# Patient Record
Sex: FEMALE | Race: WHITE | NOT HISPANIC OR LATINO | Employment: FULL TIME | URBAN - METROPOLITAN AREA
[De-identification: names, ages, dates, MRNs, and addresses within clinical notes are randomized per-mention and may not be internally consistent; named-entity substitution may affect disease eponyms.]

---

## 2017-03-03 ENCOUNTER — TELEPHONE (OUTPATIENT)
Dept: OBSTETRICS AND GYNECOLOGY | Facility: CLINIC | Age: 47
End: 2017-03-03

## 2017-03-03 NOTE — TELEPHONE ENCOUNTER
----- Message from Chago Luciano MD sent at 3/3/2017  7:29 AM EST -----  Call patient: Normal mammogram

## 2017-11-02 ENCOUNTER — OFFICE VISIT (OUTPATIENT)
Dept: OBSTETRICS AND GYNECOLOGY | Facility: CLINIC | Age: 47
End: 2017-11-02

## 2017-11-02 VITALS
WEIGHT: 110 LBS | SYSTOLIC BLOOD PRESSURE: 110 MMHG | DIASTOLIC BLOOD PRESSURE: 76 MMHG | BODY MASS INDEX: 19.49 KG/M2 | HEIGHT: 63 IN

## 2017-11-02 DIAGNOSIS — Z12.31 SCREENING MAMMOGRAM, ENCOUNTER FOR: ICD-10-CM

## 2017-11-02 DIAGNOSIS — Z01.419 WELL WOMAN EXAM WITH ROUTINE GYNECOLOGICAL EXAM: Primary | ICD-10-CM

## 2017-11-02 PROCEDURE — 99396 PREV VISIT EST AGE 40-64: CPT | Performed by: NURSE PRACTITIONER

## 2017-11-02 RX ORDER — EMOLLIENT COMBINATION NO. 15
CREAM (GRAM) MISCELLANEOUS 2 TIMES DAILY
COMMUNITY

## 2017-11-02 RX ORDER — THYROID,PORK 90 MG
TABLET ORAL
COMMUNITY
Start: 2017-10-28

## 2017-11-02 RX ORDER — AMOXICILLIN 875 MG/1
TABLET, COATED ORAL
COMMUNITY
Start: 2017-10-27 | End: 2018-12-04

## 2017-11-02 RX ORDER — ESCITALOPRAM OXALATE 20 MG/1
TABLET ORAL
COMMUNITY
Start: 2017-10-18

## 2017-11-02 NOTE — PROGRESS NOTES
[facility]  Routine Annual Visit    2017    Patient: Maria C Wheeler          MR#:[id number]      History of Present Illness    47 y.o. female  who presents for annual exam. Last annual with Dr. Medina May 2016. Pap ASCUS, HPV negative. Mammogram negative 2017. She reports no periods since January. She is receiving hormonal support through  again. She sees Dr. Ryder for primary care. She has not yet had colonoscopy. She has no complaints today.    No LMP recorded (lmp unknown). Patient is not currently having periods (Reason: Perimenopausal).  Obstetric History:  OB History      Para Term  AB Living    0 0 0 0 0 0    SAB TAB Ectopic Multiple Live Births    0 0 0 0          Menstrual History:     No LMP recorded (lmp unknown). Patient is not currently having periods (Reason: Perimenopausal).       Sexual History:       ________________________________________  There is no problem list on file for this patient.      Past Medical History:   Diagnosis Date   • Abnormal Pap smear of cervix     history of ascus, lgsil=cryotherapy   • Frequent UTI    • MVP (mitral valve prolapse)    • Pyelonephritis        Past Surgical History:   Procedure Laterality Date   • BREAST AUGMENTATION     • CHOLECYSTECTOMY     • CRYOTHERAPY     • TONSILLECTOMY      age 26       History   Smoking Status   • Never Smoker   Smokeless Tobacco   • Never Used       Family History   Problem Relation Age of Onset   • Cancer Father    • Fibromyalgia Mother    • Diverticulitis Mother    • No Known Problems Brother    • Breast cancer Cousin      age 39       Prior to Admission medications    Medication Sig Start Date End Date Taking? Authorizing Provider   amoxicillin (AMOXIL) 875 MG tablet  10/27/17  Yes Historical Provider, MD BEASLEY THYROID 90 MG tablet  10/28/17  Yes Historical Provider, MD   Cholecalciferol (VITAMIN D3) 5000 units chewable tablet Chew 2 tablet by mouth daily with  food 10/24/17  Yes Historical Provider, MD   Cream Base (PCCA LIPODERM BASE) cream Apply  topically 2 (Two) Times a Day. TESTOSTERONE AND /OR ESTROGEN 2 CLICKS BID.   Yes Historical Provider, MD   escitalopram (LEXAPRO) 20 MG tablet  10/18/17  Yes Historical Provider, MD   Progesterone Micronized (PROGESTERONE PO) Take 300 mg by mouth Every Night. 10/2/17  Yes Historical Provider, MD   medroxyPROGESTERone (PROVERA) 2.5 MG tablet Take 1 tablet by mouth daily. 5/26/16 11/2/17  Julia Medina MD   progesterone (PROMETRIUM) 100 MG capsule Take 2 capsules by mouth every night for 30 days. 5/24/16 11/2/17  Julia Medina MD     ________________________________________    Current contraception: vasectomy  History of abnormal Pap smear: yes - ascus hpv neg last year; hx lgsil  Family history of uterine or ovarian cancer: no  Family History of colon cancer/colon polyps: no  History of abnormal mammogram: no  History of abnormal lipids: no    The following portions of the patient's history were reviewed and updated as appropriate: allergies, current medications, past family history, past medical history, past social history, past surgical history and problem list.    Review of Systems   Constitutional: Negative.    HENT: Negative.    Eyes: Negative for visual disturbance.   Respiratory: Negative for cough, shortness of breath and wheezing.    Cardiovascular: Negative for chest pain, palpitations and leg swelling.   Gastrointestinal: Negative for abdominal distention, abdominal pain, blood in stool, constipation, diarrhea, nausea and vomiting.   Endocrine: Negative for cold intolerance and heat intolerance.   Genitourinary: Negative for difficulty urinating, dyspareunia, dysuria, frequency, genital sores, hematuria, menstrual problem, pelvic pain, urgency, vaginal bleeding, vaginal discharge and vaginal pain.   Musculoskeletal: Negative.    Skin: Negative.    Neurological: Negative for dizziness, weakness,  "light-headedness, numbness and headaches.   Hematological: Negative.    Psychiatric/Behavioral: Negative.    Breasts: negative for lumps skin changes, dimpling, swelling, nipple changes/discharge bilaterally       Objective   Physical Exam    /76  Ht 63\" (160 cm)  Wt 110 lb (49.9 kg)  LMP  (LMP Unknown)  Breastfeeding? No  BMI 19.49 kg/m2   BP Readings from Last 3 Encounters:   11/02/17 110/76   05/24/16 118/82      Wt Readings from Last 3 Encounters:   11/02/17 110 lb (49.9 kg)   05/24/16 126 lb (57.2 kg)        BMI: Estimated body mass index is 19.49 kg/(m^2) as calculated from the following:    Height as of this encounter: 63\" (160 cm).    Weight as of this encounter: 110 lb (49.9 kg).      General:   alert, appears stated age and cooperative   Heart: regular rate and rhythm, S1, S2 normal, no murmur, click, rub or gallop   Lungs: clear to auscultation bilaterally   Abdomen: soft, non-tender, without masses or organomegaly   Breast: inspection negative, no nipple discharge or bleeding, no masses or nodularity palpable and bilateral implants intact   Vulva: normal   Vagina: normal mucosa, normal discharge   Cervix: no bleeding following Pap, no cervical motion tenderness, no lesions and nulliparous appearance   Uterus: normal size, mobile, non-tender or normal shape and consistency   Adnexa: no mass, fullness, tenderness     Assessment:    1. Normal gynecological exam  2. Healthy lifestyle modifications discussed, counseled on self breast exams and bone health  3. Counseled on menopause     Plan:    []  Rx:   []  Mammogram request made  [x]  PAP done  []  Occult fecal blood test ([facility])  []  Labs:   []  GC/Chl/TV  []  DEXA scan   []  Referral for colonoscopy:           JER Sher  11/2/2017 9:54 AM  "

## 2017-11-07 LAB
CYTOLOGIST CVX/VAG CYTO: NORMAL
CYTOLOGY CVX/VAG DOC THIN PREP: NORMAL
DX ICD CODE: NORMAL
HIV 1 & 2 AB SER-IMP: NORMAL
HPV I/H RISK 4 DNA CVX QL PROBE+SIG AMP: NEGATIVE
Lab: NORMAL
OTHER STN SPEC: NORMAL
PATH REPORT.FINAL DX SPEC: NORMAL
STAT OF ADQ CVX/VAG CYTO-IMP: NORMAL

## 2018-12-04 ENCOUNTER — OFFICE VISIT (OUTPATIENT)
Dept: OBSTETRICS AND GYNECOLOGY | Age: 48
End: 2018-12-04

## 2018-12-04 VITALS
WEIGHT: 121 LBS | SYSTOLIC BLOOD PRESSURE: 104 MMHG | DIASTOLIC BLOOD PRESSURE: 62 MMHG | BODY MASS INDEX: 21.44 KG/M2 | HEIGHT: 63 IN

## 2018-12-04 DIAGNOSIS — Z12.11 SCREENING FOR COLON CANCER: ICD-10-CM

## 2018-12-04 DIAGNOSIS — Z01.419 WELL WOMAN EXAM WITH ROUTINE GYNECOLOGICAL EXAM: Primary | ICD-10-CM

## 2018-12-04 PROCEDURE — 99396 PREV VISIT EST AGE 40-64: CPT | Performed by: NURSE PRACTITIONER

## 2018-12-04 NOTE — PROGRESS NOTES
[facility]  Routine Annual Visit    2018    Patient: Maria C Wheeler          MR#:[id number]      History of Present Illness    48 y.o. female  who presents for annual exam. Pap negative, HPV negative 2017. Last mammogram on file negative 3/2017- pt reports negative mammo this year- calling for results from [facility]. Postmenopausal- no bleeding. She continues hormonal support through [employer] but considering not continuing. She denies new medical hx. No complaints today. Has not yet had screening colonoscopy but accepts order.    No LMP recorded (lmp unknown). Patient is postmenopausal.  Obstetric History:  OB History      Para Term  AB Living    0 0 0 0 0 0    SAB TAB Ectopic Molar Multiple Live Births    0 0 0   0           Menstrual History:     No LMP recorded (lmp unknown). Patient is postmenopausal.       Sexual History:     ______________________________________  There is no problem list on file for this patient.      Past Medical History:   Diagnosis Date   • Abnormal Pap smear of cervix     history of ascus, lgsil=cryotherapy   • Frequent UTI    • MVP (mitral valve prolapse)    • Pyelonephritis        Past Surgical History:   Procedure Laterality Date   • BREAST AUGMENTATION     • CHOLECYSTECTOMY     • CRYOTHERAPY     • TONSILLECTOMY      age 26       Social History     Tobacco Use   Smoking Status Never Smoker   Smokeless Tobacco Never Used       Family History   Problem Relation Age of Onset   • Cancer Father    • Fibromyalgia Mother    • Diverticulitis Mother    • No Known Problems Brother    • Breast cancer Cousin         age 39       Prior to Admission medications    Medication Sig Start Date End Date Taking? Authorizing Provider   GALE THYROID 90 MG tablet  10/28/17  Yes Mariah Ontiveros MD   Cholecalciferol (VITAMIN D3) 5000 units chewable tablet Chew 2 tablet by mouth daily with food 10/24/17  Yes Mariah Ontiveros MD   Cream Base  (PCCA LIPODERM BASE) cream Apply  topically 2 (Two) Times a Day. TESTOSTERONE AND /OR ESTROGEN 2 CLICKS BID.   Yes Provider, Historical, MD   Misc Natural Products (CORTISOL MANAGER PO) TAKE ONE TABLET BY MOUTH TWO HOURS prior TO bedtime 9/11/18  Yes Mariah Ontiveros MD   Progesterone Micronized (PROGESTERONE PO) Take 300 mg by mouth Every Night. 10/2/17  Yes Mariah Ontiveros MD   escitalopram (LEXAPRO) 20 MG tablet  10/18/17   Provider, Historical, MD   Misc Natural Products (PMS FORMULA INDOLPLEX PO) Take 1-2 capsules by mouth with food 10/22/18   Mariah Ontiveros MD   Probiotic Product (PROBIOTIC PEARLS ADVANTAGE PO) TAKE THREE capsules BY MOUTH EVERY DAY FOR 10 DAYS THEN ONE OR TWO EVERY DAY thereafter 9/11/18   Mariah Ontiveros MD   amoxicillin (AMOXIL) 875 MG tablet  10/27/17 12/4/18  Mariah Ontiveros MD       The following portions of the patient's history were reviewed and updated as appropriate: allergies, current medications, past family history, past medical history, past social history, past surgical history and problem list.    Review of Systems   Constitutional: Negative.    HENT: Negative.    Eyes: Negative for visual disturbance.   Respiratory: Negative for cough, shortness of breath and wheezing.    Cardiovascular: Negative for chest pain, palpitations and leg swelling.   Gastrointestinal: Negative for abdominal distention, abdominal pain, blood in stool, constipation, diarrhea, nausea and vomiting.   Endocrine: Negative for cold intolerance and heat intolerance.   Genitourinary: Negative for difficulty urinating, dyspareunia, dysuria, frequency, genital sores, hematuria, menstrual problem, pelvic pain, urgency, vaginal bleeding, vaginal discharge and vaginal pain.   Musculoskeletal: Negative.    Skin: Negative.    Neurological: Negative for dizziness, weakness, light-headedness, numbness and headaches.   Hematological: Negative.    Psychiatric/Behavioral: Negative.   "  Breasts: negative for lumps skin changes, dimpling, swelling, nipple changes/discharge bilaterally       Objective   Physical Exam    /62   Ht 160 cm (63\")   Wt 54.9 kg (121 lb)   LMP  (LMP Unknown)   BMI 21.43 kg/m²    BP Readings from Last 3 Encounters:   12/04/18 104/62   11/02/17 110/76   05/24/16 118/82      Wt Readings from Last 3 Encounters:   12/04/18 54.9 kg (121 lb)   11/02/17 49.9 kg (110 lb)   05/24/16 57.2 kg (126 lb)        BMI: Estimated body mass index is 21.43 kg/m² as calculated from the following:    Height as of this encounter: 160 cm (63\").    Weight as of this encounter: 54.9 kg (121 lb).        General:   alert, appears stated age and cooperative   Heart: regular rate and rhythm, S1, S2 normal, no murmur, click, rub or gallop   Lungs: clear to auscultation bilaterally   Abdomen: soft, non-tender, without masses or organomegaly   Breast: inspection negative, no nipple discharge or bleeding, no masses or nodularity palpable   Vulva: normal   Vagina: normal mucosa, normal discharge   Cervix: no cervical motion tenderness and no lesions   Uterus: normal size, mobile, non-tender or normal shape and consistency   Adnexa: no mass, fullness, tenderness     Assessment:    1. Normal annual exam   2. Healthy lifestyle modifications discussed, counseled on self breast exams and bone health      Plan:    []  Rx:   []  Mammogram request made  [x]  PAP done  []  Occult fecal blood test (Insure)  []  Labs:   []  GC/Chl/TV  []  DEXA scan   [x]  Referral for colonoscopy:           Destiny Castillo, JER  12/4/2018 9:46 AM  "

## 2018-12-06 LAB
CYTOLOGIST CVX/VAG CYTO: NORMAL
CYTOLOGY CVX/VAG DOC THIN PREP: NORMAL
DX ICD CODE: NORMAL
HIV 1 & 2 AB SER-IMP: NORMAL
HPV I/H RISK 4 DNA CVX QL PROBE+SIG AMP: NEGATIVE
OTHER STN SPEC: NORMAL
PATH REPORT.FINAL DX SPEC: NORMAL
STAT OF ADQ CVX/VAG CYTO-IMP: NORMAL

## 2019-12-24 ENCOUNTER — OFFICE VISIT (OUTPATIENT)
Dept: SURGERY | Facility: CLINIC | Age: 49
End: 2019-12-24

## 2019-12-24 VITALS — WEIGHT: 112 LBS | OXYGEN SATURATION: 99 % | BODY MASS INDEX: 19.84 KG/M2 | HEIGHT: 63 IN | HEART RATE: 72 BPM

## 2019-12-24 DIAGNOSIS — L72.3 SEBACEOUS CYST: Primary | ICD-10-CM

## 2019-12-24 PROCEDURE — 11421 EXC H-F-NK-SP B9+MARG 0.6-1: CPT | Performed by: SURGERY

## 2019-12-24 PROCEDURE — 88304 TISSUE EXAM BY PATHOLOGIST: CPT | Performed by: SURGERY

## 2019-12-24 PROCEDURE — 12041 INTMD RPR N-HF/GENIT 2.5CM/<: CPT | Performed by: SURGERY

## 2019-12-24 RX ORDER — HYDROCHLOROTHIAZIDE 12.5 MG/1
CAPSULE, GELATIN COATED ORAL
COMMUNITY
Start: 2019-12-02

## 2019-12-24 RX ORDER — BUPROPION HYDROCHLORIDE 75 MG/1
TABLET ORAL
COMMUNITY
Start: 2019-12-02

## 2019-12-24 RX ORDER — LEVOTHYROXINE, LIOTHYRONINE 9.5; 2.25 UG/1; UG/1
TABLET ORAL
COMMUNITY
Start: 2019-12-16

## 2019-12-28 LAB
CYTO UR: NORMAL
LAB AP CASE REPORT: NORMAL
LAB AP CLINICAL INFORMATION: NORMAL
PATH REPORT.FINAL DX SPEC: NORMAL
PATH REPORT.GROSS SPEC: NORMAL

## 2019-12-30 ENCOUNTER — TELEPHONE (OUTPATIENT)
Dept: SURGERY | Facility: CLINIC | Age: 49
End: 2019-12-30

## 2019-12-30 NOTE — TELEPHONE ENCOUNTER
Called patient and informed her of benign pathology s/p Excision of 1 cm sebaceous cyst of the neck 12/24/19.

## 2019-12-30 NOTE — TELEPHONE ENCOUNTER
----- Message from Josie Cisse MD sent at 12/29/2019  7:28 PM EST -----  Could someone please call the patient and let her know the pathology returned as expected, a benign sebaceous cyst? There was no precancerous or cancerous change identified.

## 2020-08-31 ENCOUNTER — TRANSCRIBE ORDERS (OUTPATIENT)
Dept: ADMINISTRATIVE | Facility: HOSPITAL | Age: 50
End: 2020-08-31

## 2020-08-31 DIAGNOSIS — M54.50 LOW BACK PAIN, UNSPECIFIED BACK PAIN LATERALITY, UNSPECIFIED CHRONICITY, UNSPECIFIED WHETHER SCIATICA PRESENT: Primary | ICD-10-CM

## 2020-08-31 DIAGNOSIS — N21.8 CALCULUS OF OTHER LOWER URINARY TRACT LOCATION: ICD-10-CM

## 2020-09-01 ENCOUNTER — APPOINTMENT (OUTPATIENT)
Dept: CT IMAGING | Facility: HOSPITAL | Age: 50
End: 2020-09-01

## 2020-09-03 ENCOUNTER — HOSPITAL ENCOUNTER (OUTPATIENT)
Dept: CT IMAGING | Facility: HOSPITAL | Age: 50
Discharge: HOME | End: 2020-09-03
Admitting: NURSE PRACTITIONER

## 2020-09-03 DIAGNOSIS — N21.8 CALCULUS OF OTHER LOWER URINARY TRACT LOCATION: ICD-10-CM

## 2020-09-03 DIAGNOSIS — M54.50 LOW BACK PAIN, UNSPECIFIED BACK PAIN LATERALITY, UNSPECIFIED CHRONICITY, UNSPECIFIED WHETHER SCIATICA PRESENT: ICD-10-CM

## 2020-09-03 PROCEDURE — 74176 CT ABD & PELVIS W/O CONTRAST: CPT

## 2021-03-30 ENCOUNTER — BULK ORDERING (OUTPATIENT)
Dept: CASE MANAGEMENT | Facility: OTHER | Age: 51
End: 2021-03-30

## 2021-03-30 DIAGNOSIS — Z23 IMMUNIZATION DUE: ICD-10-CM

## 2022-02-16 ENCOUNTER — OFFICE VISIT (OUTPATIENT)
Dept: OBSTETRICS AND GYNECOLOGY | Age: 52
End: 2022-02-16

## 2022-02-16 VITALS
WEIGHT: 118.8 LBS | HEIGHT: 63 IN | DIASTOLIC BLOOD PRESSURE: 78 MMHG | BODY MASS INDEX: 21.05 KG/M2 | SYSTOLIC BLOOD PRESSURE: 126 MMHG

## 2022-02-16 DIAGNOSIS — Z01.419 WELL WOMAN EXAM WITH ROUTINE GYNECOLOGICAL EXAM: Primary | ICD-10-CM

## 2022-02-16 DIAGNOSIS — R14.0 BLOATING: ICD-10-CM

## 2022-02-16 PROCEDURE — 99386 PREV VISIT NEW AGE 40-64: CPT | Performed by: NURSE PRACTITIONER

## 2022-02-16 PROCEDURE — 99213 OFFICE O/P EST LOW 20 MIN: CPT | Performed by: NURSE PRACTITIONER

## 2022-02-16 RX ORDER — CETIRIZINE HYDROCHLORIDE 10 MG/1
10 TABLET ORAL DAILY
COMMUNITY

## 2022-02-16 RX ORDER — DEXTROAMPHETAMINE SACCHARATE, AMPHETAMINE ASPARTATE, DEXTROAMPHETAMINE SULFATE AND AMPHETAMINE SULFATE 3.75; 3.75; 3.75; 3.75 MG/1; MG/1; MG/1; MG/1
1 TABLET ORAL 2 TIMES DAILY
COMMUNITY
Start: 2022-02-09

## 2022-02-16 RX ORDER — LEVOTHYROXINE SODIUM 0.15 MG/1
150 TABLET ORAL DAILY
COMMUNITY
Start: 2022-01-03

## 2022-02-16 NOTE — PROGRESS NOTES
"[facility]  Routine Annual Visit    2022    Patient: Maria C Wheeler          MR#:[id number]      History of Present Illness    51 y.o. female  who presents for annual exam as an old/new patient.     Last pap negative, HPV negative   Maria C is postmenopausal- no bleeding  Mammogram negative 2021- in [facility]  Colonoscopy current per patient    Maria C c/o intermittent bloating x several months- worse in the evenings and is associated with flatulence. US today to evaluate ovaries.   She is on several hormones prescribes by \"[employer] long discussion regarding the risks associated with these medications and the possibility of them contributing to her symptoms. Recommend discontinuing.     No LMP recorded (lmp unknown). Patient is postmenopausal.  Obstetric History:  OB History        0    Para   0    Term   0       0    AB   0    Living   0       SAB   0    IAB   0    Ectopic   0    Molar        Multiple   0    Live Births                   Menstrual History:     No LMP recorded (lmp unknown). Patient is postmenopausal.       Sexual History:       ________________________________________  There is no problem list on file for this patient.      Past Medical History:   Diagnosis Date   • Abnormal Pap smear of cervix     history of ascus, lgsil=cryotherapy   • Frequent UTI    • MVP (mitral valve prolapse)    • Pyelonephritis        Past Surgical History:   Procedure Laterality Date   • BREAST AUGMENTATION     • CHOLECYSTECTOMY     • COLONOSCOPY N/A 02/15/2019    Normal colon-Dr. Marysol Rosario   • CRYOTHERAPY     • TONSILLECTOMY      age 26       Social History     Tobacco Use   Smoking Status Never Smoker   Smokeless Tobacco Never Used       Family History   Problem Relation Age of Onset   • No Known Problems Father    • Fibromyalgia Mother    • Diverticulitis Mother    • No Known Problems Brother    • Breast cancer Cousin         age 39 "       Prior to Admission medications    Medication Sig Start Date End Date Taking? Authorizing Provider   amphetamine-dextroamphetamine (ADDERALL) 15 MG tablet Take 1 tablet by mouth 2 (Two) Times a Day. 2/9/22  Yes Mariah Ontiveros MD   APPLE CIDER VINEGAR PO Take  by mouth.   Yes Mariah Ontiveros MD ARMOUR THYROID 90 MG tablet  10/28/17  Yes Mariah Ontiveros MD   cetirizine (zyrTEC) 10 MG tablet Take 10 mg by mouth Daily.   Yes Mariah Ontiveros MD   hydroCHLOROthiazide (MICROZIDE) 12.5 MG capsule  12/2/19  Yes Mariah Ontiveros MD   levothyroxine (SYNTHROID, LEVOTHROID) 150 MCG tablet Take 150 mcg by mouth Daily. 1/3/22  Yes Mariah Ontiveros MD   Progesterone Micronized (PROGESTERONE PO) Take 300 mg by mouth Every Night. 10/2/17  Yes Mariah Ontiveros MD   Unable to find Take 1 each by mouth 1 (One) Time. [facility]   Yes Mariah Ontiveros MD   Ascorbic Acid (VITAMIN C PO) Take  by mouth.    Mariah Ontiveros MD   BIOTIN PO Take  by mouth.    Mariah Ontiveros MD   buPROPion (WELLBUTRIN) 75 MG tablet  12/2/19   Mariah Ontiveros MD   Cholecalciferol (VITAMIN D3) 5000 units chewable tablet Chew 2 tablet by mouth daily with food 10/24/17   Mariah Ontiveros MD   Cream Base (PCCA LIPODERM BASE) cream Apply  topically 2 (Two) Times a Day. TESTOSTERONE AND /OR ESTROGEN 2 CLICKS BID.    Mariah Ontiveros MD   escitalopram (LEXAPRO) 20 MG tablet  10/18/17   Provider, Historical, MD   Misc Natural Products (CORTISOL MANAGER PO) TAKE ONE TABLET BY MOUTH TWO HOURS prior TO bedtime 9/11/18   Provider, Historical, MD   Misc Natural Products (PMS FORMULA INDOLPLEX PO) Take 1-2 capsules by mouth with food 10/22/18   Mariah Ontiveros MD   Multiple Vitamins-Minerals (CLINICAL NUTRIENTS FOR WOMEN PO) Take  by mouth.    Mariah Ontiveros MD   NP THYROID 15 MG tablet  12/16/19   Mariah Ontiveros MD   Nutritional Supplements (DHEA PO) Take  by  "mouth.    ProviderMariah MD   Probiotic Product (PROBIOTIC PEARLS ADVANTAGE PO) TAKE THREE capsules BY MOUTH EVERY DAY FOR 10 DAYS THEN ONE OR TWO EVERY DAY thereafter 9/11/18   Mariah Ontiveros MD   progesterone (PROMETRIUM) 100 MG capsule Take 2 capsules by mouth every night for 30 days. 5/24/16 11/2/17  Julia Medina MD     ________________________________________    The following portions of the patient's history were reviewed and updated as appropriate: allergies, current medications, past family history, past medical history, past social history, past surgical history and problem list.    Review of Systems   Constitutional: Negative.    HENT: Negative.    Eyes: Negative for visual disturbance.   Respiratory: Negative for cough, shortness of breath and wheezing.    Cardiovascular: Negative for chest pain, palpitations and leg swelling.   Gastrointestinal: Negative for abdominal distention, abdominal pain, blood in stool, constipation, diarrhea, nausea and vomiting.   Endocrine: Negative for cold intolerance and heat intolerance.   Genitourinary: Negative for difficulty urinating, dyspareunia, dysuria, frequency, genital sores, hematuria, menstrual problem, pelvic pain, urgency, vaginal bleeding, vaginal discharge and vaginal pain.   Musculoskeletal: Negative.    Skin: Negative.    Neurological: Negative for dizziness, weakness, light-headedness, numbness and headaches.   Hematological: Negative.    Psychiatric/Behavioral: Negative.    Breasts: negative for lumps skin changes, dimpling, swelling, nipple changes/discharge bilaterally         Objective   Physical Exam    /78   Ht 160 cm (63\")   Wt 53.9 kg (118 lb 12.8 oz)   LMP  (LMP Unknown)   Breastfeeding No   BMI 21.04 kg/m²    BP Readings from Last 3 Encounters:   02/16/22 126/78   12/04/18 104/62   11/02/17 110/76      Wt Readings from Last 3 Encounters:   02/16/22 53.9 kg (118 lb 12.8 oz)   12/24/19 50.8 kg (112 lb)   12/04/18 " "54.9 kg (121 lb)        BMI: Estimated body mass index is 21.04 kg/m² as calculated from the following:    Height as of this encounter: 160 cm (63\").    Weight as of this encounter: 53.9 kg (118 lb 12.8 oz).            General:   alert, appears stated age and cooperative   Heart: regular rate and rhythm, S1, S2 normal, no murmur, click, rub or gallop   Lungs: clear to auscultation bilaterally   Abdomen: soft, non-tender, without masses or organomegaly   Breast: inspection negative, no nipple discharge or bleeding, no masses or nodularity palpable   Vulva: External genitalia including bartholin's glands, Urethra, Huron's gland and urethra meatus are normal, Perineum, rectum and anus appear normal  and Bladder appears normal without significant prolapse    Vagina: normal mucosa, normal discharge   Cervix: no cervical motion tenderness and no lesions   Uterus: normal size, mobile, non-tender and normal shape and consistency   Adnexa: no mass, fullness, tenderness     Assessment:    Diagnoses and all orders for this visit:    1. Well woman exam with routine gynecological exam (Primary)  -     IgP, Aptima HPV    2. Bloating      US unremarkable. Recommend further workup with PCP and possibility GI. Return here if symptoms persist.     Healthy lifestyle modifications discussed, counseled on self breast exams and bone health      Destiny Castillo, JER  2/16/2022 14:09 EST  "

## 2022-02-21 LAB
CYTOLOGIST CVX/VAG CYTO: NORMAL
CYTOLOGY CVX/VAG DOC CYTO: NORMAL
CYTOLOGY CVX/VAG DOC THIN PREP: NORMAL
DX ICD CODE: NORMAL
HIV 1 & 2 AB SER-IMP: NORMAL
HPV I/H RISK 4 DNA CVX QL PROBE+SIG AMP: NEGATIVE
OTHER STN SPEC: NORMAL
STAT OF ADQ CVX/VAG CYTO-IMP: NORMAL

## 2022-05-09 ENCOUNTER — TELEPHONE (OUTPATIENT)
Dept: OBSTETRICS AND GYNECOLOGY | Age: 52
End: 2022-05-09

## 2022-05-09 NOTE — TELEPHONE ENCOUNTER
Patient called and she has stopped going to [employer] and would like to discuss her options of hormone replacement .  She is currently having severe hot flashes.  She would like for you to give her a call.

## 2022-05-11 ENCOUNTER — OFFICE VISIT (OUTPATIENT)
Dept: OBSTETRICS AND GYNECOLOGY | Age: 52
End: 2022-05-11

## 2022-05-11 VITALS
BODY MASS INDEX: 21.19 KG/M2 | DIASTOLIC BLOOD PRESSURE: 70 MMHG | SYSTOLIC BLOOD PRESSURE: 108 MMHG | WEIGHT: 119.6 LBS | HEIGHT: 63 IN

## 2022-05-11 DIAGNOSIS — N95.1 HOT FLASHES DUE TO MENOPAUSE: Primary | ICD-10-CM

## 2022-05-11 PROCEDURE — 99213 OFFICE O/P EST LOW 20 MIN: CPT | Performed by: NURSE PRACTITIONER

## 2022-05-11 RX ORDER — OMEPRAZOLE 20 MG/1
20 CAPSULE, DELAYED RELEASE ORAL DAILY
COMMUNITY

## 2022-05-11 RX ORDER — ESTRADIOL AND NORETHINDRONE ACETATE .5; .1 MG/1; MG/1
1 TABLET ORAL DAILY
Qty: 90 TABLET | Refills: 1 | Status: SHIPPED | OUTPATIENT
Start: 2022-05-11 | End: 2022-10-21 | Stop reason: SDUPTHER

## 2022-05-11 NOTE — PROGRESS NOTES
Subjective   Maria C Wheeler is a 52 y.o. female.     History of Present Illness     Maria C presents with c/o severe daily hot flashes.     Recently stopped her membership with [employer] and has noticed a significant worsening in her hot flashes since discontinuing HRT. She reports the hot flashes occur multiple times every hour and are interfering with her daily life. She has been on HRT for years due to severe vasomotor symptoms. She requests to restart HRT.  We discussed risks including DVT, stroke, MI, breast cancer    Thyroid dysfunction is managed by Dr. Ryder- has upcoming appointment. Advised full physical with him.     She had a recent normal pelvic US and mammogram negative within the year.    The following portions of the patient's history were reviewed and updated as appropriate: allergies, current medications, past family history, past medical history, past social history, past surgical history and problem list.    Review of Systems   Constitutional: Negative for chills, fatigue and fever.   Gastrointestinal: Negative for abdominal distention and abdominal pain.   Endocrine: Positive for heat intolerance.   Genitourinary: Negative for decreased urine volume, difficulty urinating, dyspareunia, dysuria, frequency, genital sores, hematuria, menstrual problem, pelvic pain, pelvic pressure, urgency, urinary incontinence, vaginal bleeding, vaginal discharge and vaginal pain.       Objective   Physical Exam  Constitutional:       Appearance: Normal appearance. She is not diaphoretic.   Neurological:      General: No focal deficit present.      Mental Status: She is alert and oriented to person, place, and time.   Psychiatric:         Mood and Affect: Mood normal.         Behavior: Behavior normal.           Assessment & Plan   Diagnoses and all orders for this visit:    1. Hot flashes due to menopause (Primary)    Other orders  -     Estradiol-Norethindrone Acet (Activella) 0.5-0.1 MG per tablet; Take 1  tablet by mouth Daily.  Dispense: 90 tablet; Refill: 1      Risks of HRT discussed in detail. Advise lowest dose of hormone for shortest amount of time. She verbalizes understanding.      JER Sher

## 2022-10-21 ENCOUNTER — TELEPHONE (OUTPATIENT)
Dept: OBSTETRICS AND GYNECOLOGY | Age: 52
End: 2022-10-21

## 2022-10-21 RX ORDER — ESTRADIOL AND NORETHINDRONE ACETATE .5; .1 MG/1; MG/1
1 TABLET ORAL DAILY
Qty: 90 TABLET | Refills: 1 | Status: SHIPPED | OUTPATIENT
Start: 2022-10-21 | End: 2023-03-01 | Stop reason: SDUPTHER

## 2022-10-21 NOTE — TELEPHONE ENCOUNTER
Patient called requesting a refill of the Activella be sent to [facility] [place].  Patient states she is completely out.  Please advise.

## 2023-03-01 ENCOUNTER — OFFICE VISIT (OUTPATIENT)
Dept: OBSTETRICS AND GYNECOLOGY | Age: 53
End: 2023-03-01
Payer: COMMERCIAL

## 2023-03-01 VITALS
SYSTOLIC BLOOD PRESSURE: 122 MMHG | BODY MASS INDEX: 21.33 KG/M2 | HEIGHT: 63 IN | DIASTOLIC BLOOD PRESSURE: 82 MMHG | WEIGHT: 120.4 LBS

## 2023-03-01 DIAGNOSIS — Z12.31 SCREENING MAMMOGRAM FOR BREAST CANCER: ICD-10-CM

## 2023-03-01 DIAGNOSIS — Z01.419 WELL WOMAN EXAM WITH ROUTINE GYNECOLOGICAL EXAM: Primary | ICD-10-CM

## 2023-03-01 PROCEDURE — 99396 PREV VISIT EST AGE 40-64: CPT | Performed by: NURSE PRACTITIONER

## 2023-03-01 RX ORDER — TRAMADOL HYDROCHLORIDE 50 MG/1
TABLET ORAL
COMMUNITY
Start: 2022-12-28

## 2023-03-01 RX ORDER — ESTRADIOL AND NORETHINDRONE ACETATE .5; .1 MG/1; MG/1
1 TABLET ORAL DAILY
Qty: 90 TABLET | Refills: 1 | Status: SHIPPED | OUTPATIENT
Start: 2023-03-01 | End: 2024-02-29

## 2023-03-01 NOTE — PROGRESS NOTES
[facility]  Routine Annual Visit    3/1/2023    Patient: Maria C Wheeler          MR#:[id number]      History of Present Illness    52 y.o. female  who presents for annual exam without major complaint. Still struggles with night sweats despite being on activella but states manageable. Reports thyroid screen current with Dr. Ryder.     No LMP recorded (lmp unknown). Patient is postmenopausal.  Obstetric History:  OB History        0    Para   0    Term   0       0    AB   0    Living   0       SAB   0    IAB   0    Ectopic   0    Molar        Multiple   0    Live Births                   Menstrual History:     No LMP recorded (lmp unknown). Patient is postmenopausal.       Sexual History:       ________________________________________  There is no problem list on file for this patient.      Past Medical History:   Diagnosis Date   • Abnormal Pap smear of cervix     history of ascus, lgsil=cryotherapy   • Frequent UTI    • MVP (mitral valve prolapse)    • Pyelonephritis        Past Surgical History:   Procedure Laterality Date   • BREAST AUGMENTATION     • CHOLECYSTECTOMY     • COLONOSCOPY N/A 02/15/2019    Normal colon-Dr. Marysol Rosario   • CRYOTHERAPY     • TONSILLECTOMY      age 26       Social History     Tobacco Use   Smoking Status Never   Smokeless Tobacco Never       Family History   Problem Relation Age of Onset   • No Known Problems Father    • Fibromyalgia Mother    • Diverticulitis Mother    • No Known Problems Brother    • Breast cancer Cousin         age 39       Prior to Admission medications    Medication Sig Start Date End Date Taking? Authorizing Provider   amphetamine-dextroamphetamine (ADDERALL) 15 MG tablet Take 1 tablet by mouth 2 (Two) Times a Day. 22  Yes Provider, MD Mariah   cetirizine (zyrTEC) 10 MG tablet Take 1 tablet by mouth Daily.   Yes Provider, MD Mariah   hydroCHLOROthiazide (MICROZIDE) 12.5 MG capsule  19  Yes  Mariah Ontiveros MD   levothyroxine (SYNTHROID, LEVOTHROID) 150 MCG tablet Take 1 tablet by mouth Daily. 1/3/22  Yes Mariah Ontiveros MD   Multiple Vitamins-Minerals (CLINICAL NUTRIENTS FOR WOMEN PO) Take  by mouth.   Yes Mariah Ontiveros MD   APPLE CIDER VINEGAR PO Take  by mouth.    Mariah Ontiveros MD   ARMOUR THYROID 90 MG tablet  10/28/17   Mariah Ontiveros MD   BIOTIN PO Take  by mouth.    Mariah Ontiveros MD   buPROPion (WELLBUTRIN) 75 MG tablet  12/2/19   Mariah Ontiveros MD   Cholecalciferol (VITAMIN D3) 5000 units chewable tablet Chew 2 tablet by mouth daily with food 10/24/17   Mariah Ontiveros MD   Cream Base (PCCA LIPODERM BASE) cream Apply  topically 2 (Two) Times a Day. TESTOSTERONE AND /OR ESTROGEN 2 CLICKS BID.    Mariah Ontiveros MD   escitalopram (LEXAPRO) 20 MG tablet  10/18/17   Mariah Ontiveros MD   Estradiol-Norethindrone Acet (Activella) 0.5-0.1 MG per tablet Take 1 tablet by mouth Daily. 10/21/22 10/21/23  Destiny Castillo APRN   Misc Natural Products (CORTISOL MANAGER PO) TAKE ONE TABLET BY MOUTH TWO HOURS prior TO bedtime 9/11/18   Mariah Ontiveros MD   Misc Natural Products (PMS FORMULA INDOLPLEX PO) Take 1-2 capsules by mouth with food 10/22/18   Mariah Ontiveros MD   NP THYROID 15 MG tablet  12/16/19   Mariah Ontiveros MD   Nutritional Supplements (DHEA PO) Take  by mouth.    Mariah Ontiveros MD   omeprazole (priLOSEC) 20 MG capsule Take 1 capsule by mouth Daily.    Mariah Ontiveros MD   Probiotic Product (PROBIOTIC PEARLS ADVANTAGE PO) TAKE THREE capsules BY MOUTH EVERY DAY FOR 10 DAYS THEN ONE OR TWO EVERY DAY thereafter 9/11/18   Mariah Ontiveros MD   traMADol (ULTRAM) 50 MG tablet  12/28/22   Mariah Ontiveros MD   Unable to find Take 1 each by mouth 1 (One) Time. [facility]    Mariah Ontiveros MD   progesterone (PROMETRIUM) 100 MG capsule Take 2 capsules by mouth every night for  "30 days. 5/24/16 11/2/17  Julia Medina MD     ________________________________________  The following portions of the patient's history were reviewed and updated as appropriate: allergies, current medications, past family history, past medical history, past social history, past surgical history and problem list.    Review of Systems   Constitutional: Negative.    HENT: Negative.    Eyes: Negative for visual disturbance.   Respiratory: Negative for cough, shortness of breath and wheezing.    Cardiovascular: Negative for chest pain, palpitations and leg swelling.   Gastrointestinal: Negative for abdominal distention, abdominal pain, blood in stool, constipation, diarrhea, nausea and vomiting.   Endocrine: Negative for cold intolerance and heat intolerance.   Genitourinary: Negative for difficulty urinating, dyspareunia, dysuria, frequency, genital sores, hematuria, menstrual problem, pelvic pain, urgency, vaginal bleeding, vaginal discharge and vaginal pain.   Musculoskeletal: Negative.    Skin: Negative.    Neurological: Negative for dizziness, weakness, light-headedness, numbness and headaches.   Hematological: Negative.    Psychiatric/Behavioral: Negative.    Breasts: negative for lumps skin changes, dimpling, swelling, nipple changes/discharge bilaterally       Objective   Physical Exam    /82   Ht 160 cm (63\")   Wt 54.6 kg (120 lb 6.4 oz)   LMP  (LMP Unknown)   BMI 21.33 kg/m²    BP Readings from Last 3 Encounters:   03/01/23 122/82   05/11/22 108/70   02/16/22 126/78      Wt Readings from Last 3 Encounters:   03/01/23 54.6 kg (120 lb 6.4 oz)   05/11/22 54.3 kg (119 lb 9.6 oz)   02/16/22 53.9 kg (118 lb 12.8 oz)        BMI: Estimated body mass index is 21.33 kg/m² as calculated from the following:    Height as of this encounter: 160 cm (63\").    Weight as of this encounter: 54.6 kg (120 lb 6.4 oz).            General:   alert, appears stated age and cooperative   Heart: regular rate and rhythm, " S1, S2 normal, no murmur, click, rub or gallop   Lungs: clear to auscultation bilaterally   Abdomen: soft, non-tender, without masses or organomegaly   Breast: inspection negative, no nipple discharge or bleeding, no masses or nodularity palpable   Vulva: External genitalia including bartholin's glands, Urethra, [place]'s gland and urethra meatus are normal, Perineum, rectum and anus appear normal  and Bladder appears normal without significant prolapse    Vagina: normal mucosa, normal discharge   Cervix: no cervical motion tenderness and no lesions   Uterus: normal size, mobile and non-tender   Adnexa: normal adnexa     Assessment:    Diagnoses and all orders for this visit:    1. Well woman exam with routine gynecological exam (Primary)  -     IgP, Aptima HPV    2. Screening mammogram for breast cancer  -     Mammo Screening Digital Tomosynthesis Bilateral With CAD; Future    Other orders  -     Estradiol-Norethindrone Acet (Activella) 0.5-0.1 MG per tablet; Take 1 tablet by mouth Daily.  Dispense: 90 tablet; Refill: 1      Healthy lifestyle modifications discussed, counseled on self breast exams and bone health    All of the patient's questions were addressed and answered, I have encouraged her to call for today's test results if she has not received them within 10 days.  Patient is advised to call with any change in her condition or with any other questions, otherwise return in 12 months for annual examination.        Destiny Castillo, APRPARKER  3/1/2023 08:38 EST

## 2023-03-08 ENCOUNTER — TELEPHONE (OUTPATIENT)
Dept: OBSTETRICS AND GYNECOLOGY | Age: 53
End: 2023-03-08
Payer: COMMERCIAL

## 2023-03-08 NOTE — TELEPHONE ENCOUNTER
Pt called c/o bloating, mostly in the afternoon and asked if there was anything otc she can get to relieve her sx. She said she forgot to talk with Destiny about it at her AE on 3/1/23. Also, can you review her pap so I can give her the results? Thanks!

## 2023-03-09 NOTE — TELEPHONE ENCOUNTER
Left message of advice and normal pap results, advised pt to call back with any further questions.

## 2023-09-15 RX ORDER — ESTRADIOL AND NORETHINDRONE ACETATE .5; .1 MG/1; MG/1
1 TABLET ORAL DAILY
Qty: 90 TABLET | Refills: 1 | Status: CANCELLED | OUTPATIENT
Start: 2023-09-15 | End: 2024-09-14

## 2023-09-15 RX ORDER — ESTRADIOL AND NORETHINDRONE ACETATE .5; .1 MG/1; MG/1
1 TABLET ORAL DAILY
Qty: 90 TABLET | Refills: 1 | Status: SHIPPED | OUTPATIENT
Start: 2023-09-15 | End: 2024-03-13

## 2023-09-15 NOTE — TELEPHONE ENCOUNTER
Pt called stating she needs a refill of activella called into the pharmacy on filed.    Pharmacy verified     Please advise

## 2024-03-04 RX ORDER — ESTRADIOL AND NORETHINDRONE ACETATE .5; .1 MG/1; MG/1
1 TABLET ORAL DAILY
Qty: 90 TABLET | Refills: 3 | Status: SHIPPED | OUTPATIENT
Start: 2024-03-04 | End: 2024-08-31

## 2024-03-26 ENCOUNTER — OFFICE VISIT (OUTPATIENT)
Dept: OBSTETRICS AND GYNECOLOGY | Age: 54
End: 2024-03-26
Payer: COMMERCIAL

## 2024-03-26 VITALS
WEIGHT: 121 LBS | HEIGHT: 63 IN | BODY MASS INDEX: 21.44 KG/M2 | DIASTOLIC BLOOD PRESSURE: 78 MMHG | SYSTOLIC BLOOD PRESSURE: 126 MMHG

## 2024-03-26 DIAGNOSIS — Z01.419 WELL FEMALE EXAM WITH ROUTINE GYNECOLOGICAL EXAM: Primary | ICD-10-CM

## 2024-03-26 DIAGNOSIS — Z12.4 SCREENING FOR MALIGNANT NEOPLASM OF CERVIX: ICD-10-CM

## 2024-03-26 DIAGNOSIS — Z12.31 SCREENING MAMMOGRAM FOR BREAST CANCER: ICD-10-CM

## 2024-03-26 DIAGNOSIS — Z11.51 SCREENING FOR HUMAN PAPILLOMAVIRUS (HPV): ICD-10-CM

## 2024-03-26 DIAGNOSIS — Z79.890 HORMONE REPLACEMENT THERAPY, POSTMENOPAUSAL: ICD-10-CM

## 2024-03-26 RX ORDER — PROGESTERONE 100 MG/1
100 CAPSULE ORAL
Qty: 90 CAPSULE | Refills: 0 | Status: SHIPPED | OUTPATIENT
Start: 2024-03-26

## 2024-03-26 RX ORDER — NORETHINDRONE ACETATE AND ETHINYL ESTRADIOL .5; 2.5 MG/1; UG/1
1 TABLET ORAL DAILY
Qty: 30 TABLET | Refills: 12 | Status: CANCELLED | OUTPATIENT
Start: 2024-03-26 | End: 2024-04-25

## 2024-03-26 RX ORDER — ESTRADIOL 1 MG/1
1 TABLET ORAL EVERY EVENING
Qty: 90 TABLET | Refills: 0 | Status: SHIPPED | OUTPATIENT
Start: 2024-03-26

## 2024-03-26 NOTE — PROGRESS NOTES
Subjective     History of Present Illness    Chief Complaint   Patient presents with    Gynecologic Exam     AE Today, last pap 3/1/23 (-), HPV (-),  MG 23, ,colonoscopy about 5 yrs ago. Pt c/o hot flashes and increased weight gain       Caitlin Wheeler is a 53 y.o. female who presents for annual exam.  C/o night sweats.  Currently on Activella, which is not giving relief.  Reports it keeps her up at night, and she is feeling fatigued. Also reports weight gain of 7-8 lbs. No hx blood clots, stroke, or cardiac events.   No PMB.  Declines STD testing.  Saw PCP this morning, completed wellness labs and is adjusting medications. Increased wellbutrin, added adderall, kept levothyroxine at 150 mcg.    Obstetric History:  OB History          0    Para   0    Term   0       0    AB   0    Living   0         SAB   0    IAB   0    Ectopic   0    Molar        Multiple   0    Live Births                   Menstrual History:     No LMP recorded (lmp unknown). Patient is postmenopausal.           Current contraception: post menopausal status  History of abnormal Pap smear: yes - ASCUS/negative HPV in 2016, normal since  Received Gardasil immunization: no  Perform regular self breast exam: yes -    Family history of uterine or ovarian cancer: no  Family History of colon cancer: no  Family history of breast cancer: yes - maternal cousin dx 40 y/o    PAP: done today  Mammogram: ordered.  Colonoscopy: up to date. - normal   DEXA: not indicated.    Exercise: moderately active  Calcium/Vitamin D: adequate intake and uses supplements    The following portions of the patient's history were reviewed and updated as appropriate: allergies, current medications, past family history, past medical history, past social history, past surgical history, and problem list.    Review of Systems  A comprehensive review of systems was negative except for: Constitutional: positive for night sweats, weight gain       Objective  "  Physical Exam    /78   Ht 160 cm (63\")   Wt 54.9 kg (121 lb)   LMP  (LMP Unknown)   BMI 21.43 kg/m²      General: alert, appears stated age, cooperative, and no distress   Heart: regular rate and rhythm, S1, S2 normal, no murmur, click, rub or gallop   Lungs: clear to auscultation bilaterally   Abdomen: soft, non-tender, without masses or organomegaly   Breast: inspection negative, no nipple discharge or bleeding, no masses or nodularity palpable   External genitalia/Vulva: External genitalia including bartholin's glands, Urethra, [place]'s gland and urethra meatus are normal and Bladder appears normal without significant prolapse    Vagina: normal mucosa, normal discharge   Cervix: no lesions   Uterus: normal size and non-tender   Adnexa: normal adnexa and no mass, fullness, tenderness   Neurologic: Alert and Oriented x3   Psychiatric: Normal affect, judgement, and mood       Assessment & Plan   Diagnoses and all orders for this visit:    1. Well female exam with routine gynecological exam (Primary)  -     IGP, Apt HPV,rfx 16 / 18,45    2. Screening for human papillomavirus (HPV)  -     IGP, Apt HPV,rfx 16 / 18,45    3. Screening for malignant neoplasm of cervix  -     IGP, Apt HPV,rfx 16 / 18,45    4. Screening mammogram for breast cancer  -     Mammo Screening Digital Tomosynthesis Bilateral With CAD; Future    5. Hormone replacement therapy, postmenopausal  -     estradiol (ESTRACE) 1 MG tablet; Take 1 tablet by mouth Every Evening.  Dispense: 90 tablet; Refill: 0  -     Progesterone (Prometrium) 100 MG capsule; Take 1 capsule by mouth every night at bedtime.  Dispense: 90 capsule; Refill: 0          All questions answered.  Pap smear with HPV co-testing done today, will call pt with results  Breast self exam technique reviewed and patient encouraged to perform self-exam monthly.  Physical activity and regular exercise encouraged.  Discussed healthy lifestyle modifications.  Discussed calcium and " vitamin D needs to prevent osteoporosis.  HRT - patient to stop Activella, and begin estradiol 1 mg and progesterone 100 mg daily.  Reviewed risk of DVT, stroke, MI, breast cancer.  Will follow-up in 3 months to assess new HRT efficacy.  Plan to check TSH at next appointment if PCP has not. Patient verbalizes understanding and is agreeable to plan.    Return in about 3 months (around 6/26/2024) for HRT f/u.

## 2024-03-29 LAB
CYTOLOGIST CVX/VAG CYTO: NORMAL
CYTOLOGY CVX/VAG DOC CYTO: NORMAL
CYTOLOGY CVX/VAG DOC THIN PREP: NORMAL
DX ICD CODE: NORMAL
HPV I/H RISK 4 DNA CVX QL PROBE+SIG AMP: NEGATIVE
Lab: NORMAL
OTHER STN SPEC: NORMAL
STAT OF ADQ CVX/VAG CYTO-IMP: NORMAL

## 2024-06-23 DIAGNOSIS — Z79.890 HORMONE REPLACEMENT THERAPY, POSTMENOPAUSAL: ICD-10-CM

## 2024-06-25 DIAGNOSIS — Z79.890 HORMONE REPLACEMENT THERAPY, POSTMENOPAUSAL: ICD-10-CM

## 2024-06-25 RX ORDER — PROGESTERONE 100 MG/1
100 CAPSULE ORAL
Qty: 90 CAPSULE | Refills: 0 | Status: SHIPPED | OUTPATIENT
Start: 2024-06-25 | End: 2024-06-26 | Stop reason: SDUPTHER

## 2024-06-25 RX ORDER — ESTRADIOL 1 MG/1
1 TABLET ORAL EVERY EVENING
Qty: 90 TABLET | Refills: 0 | Status: SHIPPED | OUTPATIENT
Start: 2024-06-25 | End: 2024-06-26 | Stop reason: SDUPTHER

## 2024-06-25 RX ORDER — ESTRADIOL 1 MG/1
1 TABLET ORAL EVERY EVENING
Qty: 90 TABLET | Refills: 0 | OUTPATIENT
Start: 2024-06-25

## 2024-06-25 RX ORDER — PROGESTERONE 100 MG/1
100 CAPSULE ORAL
Qty: 90 CAPSULE | Refills: 0 | OUTPATIENT
Start: 2024-06-25

## 2024-06-26 ENCOUNTER — OFFICE VISIT (OUTPATIENT)
Dept: OBSTETRICS AND GYNECOLOGY | Age: 54
End: 2024-06-26
Payer: COMMERCIAL

## 2024-06-26 VITALS
BODY MASS INDEX: 19.42 KG/M2 | HEIGHT: 63 IN | DIASTOLIC BLOOD PRESSURE: 70 MMHG | SYSTOLIC BLOOD PRESSURE: 104 MMHG | WEIGHT: 109.6 LBS

## 2024-06-26 DIAGNOSIS — Z79.890 HORMONE REPLACEMENT THERAPY, POSTMENOPAUSAL: Primary | ICD-10-CM

## 2024-06-26 DIAGNOSIS — E03.9 HYPOTHYROIDISM, UNSPECIFIED TYPE: ICD-10-CM

## 2024-06-26 PROCEDURE — 99213 OFFICE O/P EST LOW 20 MIN: CPT

## 2024-06-26 RX ORDER — ESTRADIOL 1 MG/1
1 TABLET ORAL EVERY EVENING
Qty: 90 TABLET | Refills: 3 | Status: SHIPPED | OUTPATIENT
Start: 2024-06-26

## 2024-06-26 RX ORDER — ESTRADIOL AND NORETHINDRONE ACETATE .5; .1 MG/1; MG/1
1 TABLET ORAL DAILY
COMMUNITY
Start: 2024-05-24 | End: 2024-06-26

## 2024-06-26 RX ORDER — PROGESTERONE 100 MG/1
100 CAPSULE ORAL
Qty: 90 CAPSULE | Refills: 3 | Status: SHIPPED | OUTPATIENT
Start: 2024-06-26

## 2024-06-26 NOTE — PROGRESS NOTES
"Subjective     History of Present Illness  Caitlin Wheeler is a 54 y.o.  female is being seen today for   Chief Complaint   Patient presents with    Follow-up     Gyn f/u Hrt     Patient here today for 3 month f/u on HRT. Started on estradiol 1 mg tablets and progesterone 100 mg capsules, both taken daily. Reports night sweats have improved greatly, she is getting sleep now. No vaginal bleeding. No hx blood clots, stroke, or cardiac events. On Levothyroxine 150 mcg, has not had TSH checked recently. Has not scheduled annual with PCP yet.     The following portions of the patient's history were reviewed and updated as appropriate: allergies, current medications, past family history, past medical history, past social history, past surgical history and problem list.    /70   Ht 160 cm (63\")   Wt 49.7 kg (109 lb 9.6 oz)   LMP  (LMP Unknown)   BMI 19.41 kg/m²         Review of Systems   Constitutional: Negative.    HENT: Negative.     Eyes: Negative.    Respiratory: Negative.     Cardiovascular: Negative.    Gastrointestinal: Negative.    Endocrine: Negative.    Genitourinary: Negative.    Musculoskeletal: Negative.    Skin: Negative.    Allergic/Immunologic: Negative.    Neurological: Negative.    Hematological: Negative.    Psychiatric/Behavioral: Negative.         Objective   Physical Exam  Constitutional:       General: She is not in acute distress.  Cardiovascular:      Rate and Rhythm: Normal rate and regular rhythm.      Pulses: Normal pulses.      Heart sounds: Normal heart sounds.   Pulmonary:      Effort: Pulmonary effort is normal.      Breath sounds: Normal breath sounds.   Neurological:      Mental Status: She is alert and oriented to person, place, and time.   Psychiatric:         Mood and Affect: Mood normal.         Behavior: Behavior normal.         Thought Content: Thought content normal.         Judgment: Judgment normal.           Assessment & Plan   Diagnoses and all orders for this " visit:    1. Hormone replacement therapy, postmenopausal (Primary)  -     estradiol (ESTRACE) 1 MG tablet; Take 1 tablet by mouth Every Evening.  Dispense: 90 tablet; Refill: 3  -     Progesterone (Prometrium) 100 MG capsule; Take 1 capsule by mouth every night at bedtime.  Dispense: 90 capsule; Refill: 3    2. Hypothyroidism, unspecified type  -     TSH+Free T4    -Patient doing well on current HRT, estradiol 1 mg tablets and progesterone 100 mg capsules sent to pharmacy with refills for the year.  -TSH and free T4 completed today, Will call patient with results and treat accordingly.    All questions answered. Patient verbalizes understanding and is agreeable to plan.  Return for Annual Exam after 3/26/25.

## 2024-06-27 LAB
T4 FREE SERPL-MCNC: 2.24 NG/DL (ref 0.92–1.68)
TSH SERPL DL<=0.005 MIU/L-ACNC: 0.43 UIU/ML (ref 0.27–4.2)

## 2024-09-19 DIAGNOSIS — Z79.890 HORMONE REPLACEMENT THERAPY, POSTMENOPAUSAL: ICD-10-CM

## 2025-03-10 RX ORDER — PROGESTERONE 100 MG/1
100 CAPSULE ORAL
Qty: 90 CAPSULE | Refills: 0 | Status: SHIPPED | OUTPATIENT
Start: 2025-03-10

## 2025-03-10 RX ORDER — ESTRADIOL 1 MG/1
1 TABLET ORAL EVERY EVENING
Qty: 90 TABLET | Refills: 0 | Status: SHIPPED | OUTPATIENT
Start: 2025-03-10

## 2025-06-10 DIAGNOSIS — Z79.890 HORMONE REPLACEMENT THERAPY, POSTMENOPAUSAL: ICD-10-CM

## 2025-06-10 RX ORDER — ESTRADIOL 1 MG/1
1 TABLET ORAL EVERY EVENING
Qty: 90 TABLET | Refills: 0 | Status: SHIPPED | OUTPATIENT
Start: 2025-06-10